# Patient Record
Sex: MALE | URBAN - METROPOLITAN AREA
[De-identification: names, ages, dates, MRNs, and addresses within clinical notes are randomized per-mention and may not be internally consistent; named-entity substitution may affect disease eponyms.]

---

## 2022-11-17 ENCOUNTER — TELEPHONE (OUTPATIENT)
Dept: FAMILY MEDICINE CLINIC | Facility: CLINIC | Age: 47
End: 2022-11-17

## 2022-11-17 NOTE — TELEPHONE ENCOUNTER
Future Appointments   Date Time Provider Antonio De Anda   11/23/2022  3:40 PM Nestor Lozano, [facility] Aisha Schwab

## 2022-11-17 NOTE — TELEPHONE ENCOUNTER
Pt called his son (Gail Zeng) is a patient of Dr. Kami Yu. Patient has [employer] and new to the area. Pt wants to know if Dr. Kami Yu would be willing to accept him as a new pt?

## 2022-11-25 ENCOUNTER — MED REC SCAN ONLY (OUTPATIENT)
Dept: FAMILY MEDICINE CLINIC | Facility: CLINIC | Age: 47
End: 2022-11-25

## 2022-12-09 ENCOUNTER — OFFICE VISIT (OUTPATIENT)
Dept: FAMILY MEDICINE CLINIC | Facility: CLINIC | Age: 47
End: 2022-12-09
Payer: OTHER GOVERNMENT

## 2022-12-09 ENCOUNTER — TELEPHONE (OUTPATIENT)
Dept: FAMILY MEDICINE CLINIC | Facility: CLINIC | Age: 47
End: 2022-12-09

## 2022-12-09 VITALS
RESPIRATION RATE: 21 BRPM | OXYGEN SATURATION: 98 % | WEIGHT: 155 LBS | TEMPERATURE: 97 F | DIASTOLIC BLOOD PRESSURE: 70 MMHG | BODY MASS INDEX: 24.91 KG/M2 | HEIGHT: 66.14 IN | SYSTOLIC BLOOD PRESSURE: 120 MMHG | HEART RATE: 83 BPM

## 2022-12-09 DIAGNOSIS — B34.9 VIRAL SYNDROME: Primary | ICD-10-CM

## 2022-12-09 DIAGNOSIS — R05.1 ACUTE COUGH: ICD-10-CM

## 2022-12-09 PROCEDURE — 3078F DIAST BP <80 MM HG: CPT | Performed by: FAMILY MEDICINE

## 2022-12-09 PROCEDURE — 3074F SYST BP LT 130 MM HG: CPT | Performed by: FAMILY MEDICINE

## 2022-12-09 PROCEDURE — 99213 OFFICE O/P EST LOW 20 MIN: CPT | Performed by: FAMILY MEDICINE

## 2022-12-09 PROCEDURE — 3008F BODY MASS INDEX DOCD: CPT | Performed by: FAMILY MEDICINE

## 2022-12-09 NOTE — TELEPHONE ENCOUNTER
Pt called he said he has been sick all week. Started on Sunday night got worse til Wednesday. Thursday started feeling a little better. He said he feels run down,has sweats,congestion, and cough. At home covid test negative. Pt wants to know if he could be seen today? He is also needing a note for work? Pt was advised  is out of office.  Can another provider fit in?

## 2022-12-09 NOTE — TELEPHONE ENCOUNTER
Patient's name and  verified     Symptoms Started  night, congestion.      Patient is experiencing no energy    Clear nasal drainage, No fever, No wheezing   Cough, not productive cough    Patient needs a note for work     Patient notified and verbalized an understanding    Future Appointments   Date Time Provider Antonio De Anda   2022  3:30 PM Valery Gonzales [facility] SOHAIL Lopez

## 2022-12-16 ENCOUNTER — TELEPHONE (OUTPATIENT)
Dept: FAMILY MEDICINE CLINIC | Facility: CLINIC | Age: 47
End: 2022-12-16

## 2022-12-16 DIAGNOSIS — R05.2 SUBACUTE COUGH: Primary | ICD-10-CM

## 2022-12-16 RX ORDER — AMOXICILLIN AND CLAVULANATE POTASSIUM 875; 125 MG/1; MG/1
1 TABLET, FILM COATED ORAL 2 TIMES DAILY
Qty: 20 TABLET | Refills: 0 | Status: SHIPPED | OUTPATIENT
Start: 2022-12-16 | End: 2022-12-26

## 2022-12-16 NOTE — TELEPHONE ENCOUNTER
Advised patient of Doctor's note below. Advised pt to call office back for condition update if no improvement after abx - Patient verbalized understanding. No further questions at this time.

## 2022-12-16 NOTE — TELEPHONE ENCOUNTER
Patient was seen 12/9 by dr Hiren Dunbar in office    Spouse states the cough never really went away    Patient feels he is now getting sicker again    No fever  No shortness of breath    Chest does hurt when coughing   Patient states he hears gurgling in his lungs when breathing    Please adv  Thank you

## 2022-12-16 NOTE — TELEPHONE ENCOUNTER
LOV 12/09/2022 - with Dr. Betzy Ling for viral illness  \"PLAN: monitor for now. if not getting better, or getting worse into next week, then I would put him on an antibiotic. \"    Please review note below    Send abx?   Please advise, thank you

## 2023-03-23 ENCOUNTER — OFFICE VISIT (OUTPATIENT)
Dept: FAMILY MEDICINE CLINIC | Facility: CLINIC | Age: 48
End: 2023-03-23
Payer: OTHER GOVERNMENT

## 2023-03-23 VITALS
HEART RATE: 75 BPM | DIASTOLIC BLOOD PRESSURE: 84 MMHG | BODY MASS INDEX: 26 KG/M2 | WEIGHT: 161.13 LBS | RESPIRATION RATE: 18 BRPM | TEMPERATURE: 99 F | OXYGEN SATURATION: 98 % | SYSTOLIC BLOOD PRESSURE: 126 MMHG

## 2023-03-23 DIAGNOSIS — B07.0 PLANTAR WART OF RIGHT FOOT: Primary | ICD-10-CM

## 2023-03-23 PROCEDURE — 3074F SYST BP LT 130 MM HG: CPT | Performed by: FAMILY MEDICINE

## 2023-03-23 PROCEDURE — 99213 OFFICE O/P EST LOW 20 MIN: CPT | Performed by: FAMILY MEDICINE

## 2023-03-23 PROCEDURE — 3079F DIAST BP 80-89 MM HG: CPT | Performed by: FAMILY MEDICINE

## 2023-10-25 ENCOUNTER — OFFICE VISIT (OUTPATIENT)
Dept: FAMILY MEDICINE CLINIC | Facility: CLINIC | Age: 48
End: 2023-10-25

## 2023-10-25 VITALS
RESPIRATION RATE: 16 BRPM | HEART RATE: 71 BPM | DIASTOLIC BLOOD PRESSURE: 62 MMHG | OXYGEN SATURATION: 99 % | BODY MASS INDEX: 25 KG/M2 | SYSTOLIC BLOOD PRESSURE: 108 MMHG | TEMPERATURE: 98 F | WEIGHT: 152.5 LBS

## 2023-10-25 DIAGNOSIS — B07.0 PLANTAR WART OF RIGHT FOOT: Primary | ICD-10-CM

## 2023-10-25 PROCEDURE — 3074F SYST BP LT 130 MM HG: CPT | Performed by: FAMILY MEDICINE

## 2023-10-25 PROCEDURE — 17110 DESTRUCTION B9 LES UP TO 14: CPT | Performed by: FAMILY MEDICINE

## 2023-10-25 PROCEDURE — 99213 OFFICE O/P EST LOW 20 MIN: CPT | Performed by: FAMILY MEDICINE

## 2023-10-25 PROCEDURE — 3078F DIAST BP <80 MM HG: CPT | Performed by: FAMILY MEDICINE

## 2025-01-06 ENCOUNTER — OFFICE VISIT (OUTPATIENT)
Dept: FAMILY MEDICINE CLINIC | Facility: CLINIC | Age: 50
End: 2025-01-06
Payer: OTHER GOVERNMENT

## 2025-01-06 VITALS
DIASTOLIC BLOOD PRESSURE: 76 MMHG | BODY MASS INDEX: 27 KG/M2 | RESPIRATION RATE: 16 BRPM | HEART RATE: 104 BPM | TEMPERATURE: 98 F | OXYGEN SATURATION: 96 % | SYSTOLIC BLOOD PRESSURE: 120 MMHG | WEIGHT: 167.38 LBS

## 2025-01-06 DIAGNOSIS — B07.0 PLANTAR WART, RIGHT FOOT: Primary | ICD-10-CM

## 2025-01-06 PROCEDURE — 99213 OFFICE O/P EST LOW 20 MIN: CPT | Performed by: FAMILY MEDICINE

## 2025-01-06 PROCEDURE — 17110 DESTRUCTION B9 LES UP TO 14: CPT | Performed by: FAMILY MEDICINE

## 2025-01-06 RX ORDER — CLOTRIMAZOLE AND BETAMETHASONE DIPROPIONATE 10; .64 MG/G; MG/G
CREAM TOPICAL
Qty: 45 G | Refills: 0 | Status: SHIPPED | OUTPATIENT
Start: 2025-01-06

## 2025-01-06 NOTE — PROGRESS NOTES
Danny Zeng is a 49 year old male.  HPI:   Danny is here for evaluation of a wart on his  right great. Was last treated WELL OVER A YEAR AGO has, never returned for treatment has been using a grater to try and remov as much callous as possible  Current Outpatient Medications   Medication Sig Dispense Refill    cholecalciferol 125 MCG (5000 UT) Oral Cap Take by mouth.      clotrimazole-betamethasone 1-0.05 % External Cream Apply to the affected area bid for 10 days 45 g 0    Meloxicam 7.5 MG Oral Tab Take 1 tablet (7.5 mg total) by mouth daily.      sertraline 50 MG Oral Tab Take 1 tablet (50 mg total) by mouth daily.        No past medical history on file.   Social History:  Social History     Socioeconomic History    Marital status:    Tobacco Use    Smoking status: Never    Smokeless tobacco: Never   Vaping Use    Vaping status: Never Used   Substance and Sexual Activity    Alcohol use: Yes     Comment: socially    Drug use: Yes     Types: Cannabis     Social Drivers of Health      Received from [facility]    Housing Stability        REVIEW OF SYSTEMS:   GENERAL HEALTH: feels well otherwise  SKIN: wart  RIGHT GREAT TOE  RESPIRATORY: denies shortness of breath with exertion  CARDIOVASCULAR: denies chest pain on exertion  GI: denies abdominal pain and denies heartburn  NEURO: denies headaches    EXAM:   /76   Pulse 104   Temp 97.9 °F (36.6 °C) (Temporal)   Resp 16   Wt 167 lb 6 oz (75.9 kg)   SpO2 96%   BMI 26.90 kg/m²   GENERAL: well developed, well nourished,in no apparent distress  SKIN: there is a small cluster of 3 small warts on the plantar surface of the right great toe  EXTREMITIES: no cyanosis, clubbing or edema    ASSESSMENT AND PLAN:     Encounter Diagnosis   Name Primary?    Plantar wart, right foot Yes   The site was debrided using a #15 blade to the core of the wart, after which liquid nitrogen was applied to the wart in freeze thaw fashion x 3, keep  clean and dry RTC in 3 weeks to recheck if still present         Meds & Refills for this Visit:  Requested Prescriptions     Signed Prescriptions Disp Refills    clotrimazole-betamethasone 1-0.05 % External Cream 45 g 0     Sig: Apply to the affected area bid for 10 days       Imaging & Consults:  None     The patient indicates understanding of these issues and agrees to the plan.  The patient is asked to return in 3 weeks for recheck and CPX.

## 2025-01-07 PROBLEM — H17.9 CORNEAL SCAR: Status: ACTIVE | Noted: 2025-01-07

## 2025-01-07 PROBLEM — H15.109 EPISCLERITIS: Status: ACTIVE | Noted: 2025-01-07

## 2025-01-07 PROBLEM — T78.1XXA ADVERSE FOOD REACTION: Status: ACTIVE | Noted: 2025-01-07

## 2025-01-07 PROBLEM — F41.9 ANXIETY DISORDER, UNSPECIFIED: Status: ACTIVE | Noted: 2025-01-07

## 2025-01-07 PROBLEM — B02.33 HERPES ZOSTER KERATOCONJUNCTIVITIS: Status: ACTIVE | Noted: 2025-01-07

## 2025-01-07 PROBLEM — M41.20 IDIOPATHIC SCOLIOSIS AND KYPHOSCOLIOSIS: Status: ACTIVE | Noted: 2025-01-07

## 2025-01-07 PROBLEM — J30.9 ALLERGIC RHINITIS: Status: ACTIVE | Noted: 2025-01-07

## 2025-01-07 PROBLEM — E55.9 VITAMIN D DEFICIENCY: Status: ACTIVE | Noted: 2025-01-07

## 2025-01-07 PROBLEM — F43.12 CHRONIC POST-TRAUMATIC STRESS DISORDER: Status: ACTIVE | Noted: 2025-01-07

## 2025-01-07 PROBLEM — F32.A DEPRESSION, UNSPECIFIED: Status: ACTIVE | Noted: 2025-01-07

## 2025-01-23 ENCOUNTER — OFFICE VISIT (OUTPATIENT)
Dept: FAMILY MEDICINE CLINIC | Facility: CLINIC | Age: 50
End: 2025-01-23
Payer: OTHER GOVERNMENT

## 2025-01-23 VITALS
DIASTOLIC BLOOD PRESSURE: 82 MMHG | SYSTOLIC BLOOD PRESSURE: 128 MMHG | RESPIRATION RATE: 16 BRPM | HEART RATE: 91 BPM | OXYGEN SATURATION: 96 % | TEMPERATURE: 97 F

## 2025-01-23 DIAGNOSIS — B07.0 PLANTAR WART, RIGHT FOOT: Primary | ICD-10-CM

## 2025-01-23 PROCEDURE — 17110 DESTRUCTION B9 LES UP TO 14: CPT | Performed by: FAMILY MEDICINE

## 2025-01-23 PROCEDURE — 99213 OFFICE O/P EST LOW 20 MIN: CPT | Performed by: FAMILY MEDICINE

## 2025-01-23 NOTE — PROGRESS NOTES
Danny Zeng is a 49 year old male.  HPI:   Danny is here for evaluation of a wart on his  right great. Was last treated WELL OVER A YEAR AGO has, never returned for treatment has been using a grater to try and remov as much callous as possible, we froze it and seemed to get a good response he has been using a grater to remove excess callous. Thinks its better. Has been using topical caustic with some response.  Current Outpatient Medications   Medication Sig Dispense Refill    cholecalciferol 125 MCG (5000 UT) Oral Cap Take by mouth.      clotrimazole-betamethasone 1-0.05 % External Cream Apply to the affected area bid for 10 days 45 g 0    Meloxicam 7.5 MG Oral Tab Take 1 tablet (7.5 mg total) by mouth daily.      sertraline 50 MG Oral Tab Take 1 tablet (50 mg total) by mouth daily.        No past medical history on file.   Social History:  Social History     Socioeconomic History    Marital status:    Tobacco Use    Smoking status: Never    Smokeless tobacco: Never   Vaping Use    Vaping status: Never Used   Substance and Sexual Activity    Alcohol use: Yes     Comment: socially    Drug use: Yes     Types: Cannabis     Social Drivers of Health      Received from [facility]    Housing Stability        REVIEW OF SYSTEMS:   GENERAL HEALTH: feels well otherwise  SKIN: wart  RIGHT GREAT TOE  RESPIRATORY: denies shortness of breath with exertion  CARDIOVASCULAR: denies chest pain on exertion  GI: denies abdominal pain and denies heartburn  NEURO: denies headaches    EXAM:   /82   Pulse 91   Temp 97.3 °F (36.3 °C) (Temporal)   Resp 16   SpO2 96%   GENERAL: well developed, well nourished,in no apparent distress  SKIN:  the previously noted cluster of 3 small warts on the plantar surface of the right great toe are pretty much gone, there appears to be a hematoma present.  EXTREMITIES: no cyanosis, clubbing or edema    ASSESSMENT AND PLAN:     Encounter Diagnosis   Name Primary?     Plantar wart, right foot Yes     The site was debrided using a #15 blade to the core of the wart, after which liquid nitrogen was applied to the wart in freeze thaw fashion x 3, keep clean and dry RTC in 3 weeks to recheck if still present         Meds & Refills for this Visit:  Requested Prescriptions      No prescriptions requested or ordered in this encounter       Imaging & Consults:  None     The patient indicates understanding of these issues and agrees to the plan.  The patient is asked to return in 3 weeks for recheck and CPX.

## 2025-02-10 ENCOUNTER — OFFICE VISIT (OUTPATIENT)
Dept: FAMILY MEDICINE CLINIC | Facility: CLINIC | Age: 50
End: 2025-02-10
Payer: OTHER GOVERNMENT

## 2025-02-10 VITALS
BODY MASS INDEX: 26.44 KG/M2 | HEIGHT: 66.5 IN | SYSTOLIC BLOOD PRESSURE: 110 MMHG | WEIGHT: 166.5 LBS | RESPIRATION RATE: 16 BRPM | HEART RATE: 91 BPM | TEMPERATURE: 97 F | OXYGEN SATURATION: 97 % | DIASTOLIC BLOOD PRESSURE: 74 MMHG

## 2025-02-10 DIAGNOSIS — Z00.00 ROUTINE HEALTH MAINTENANCE: Primary | ICD-10-CM

## 2025-02-10 PROCEDURE — 99396 PREV VISIT EST AGE 40-64: CPT | Performed by: FAMILY MEDICINE

## 2025-02-10 NOTE — PROGRESS NOTES
Danny Zeng is a 49 year old male who presents for a complete physical exam.   HPI:   Pt complains of nothing at this time. [facility] and also [facility], has a HX of fissures, was on meds,  he seems to have recovered but had a triple hemmoroidecty, he drinks enough water ,  uses a Bidet on a regularly. Had a negative colonoscopy 2022, in the .   Immunization History   Administered Date(s) Administered    Anthrax 12/14/2000, 01/11/2001, 01/24/2001, 07/03/2007, 04/05/2008, 10/10/2008, 10/14/2009, 10/25/2010, 11/08/2011, 07/02/2013, 07/30/2014, 06/17/2020    Covid-19 Vaccine Pfizer 30 mcg/0.3 ml 05/05/2021, 05/27/2021    FLU VAC QIV SPLIT 3 YRS AND OLDER (67029) 11/06/2020    FLUMIST NASAL 2 YR-49 YRS (63964) 09/16/2013, 09/08/2014    FLUZONE 6 months and older PFS 0.5 ml (47827) 10/26/2015, 10/18/2016, 10/23/2017, 10/18/2018, 10/23/2019, 12/17/2021, 10/17/2022, 01/03/2024    HEP B, Adult 04/05/2008, 06/25/2008, 12/04/2008    Hep A, Adult 04/17/1998, 10/22/1998    Influenza 10/22/1998, 11/20/1998, 10/26/1999, 12/14/2000, 11/27/2001, 11/18/2002, 12/12/2005, 11/21/2007, 10/03/2010    Influenza A, H1N1 Vaccine 12/10/2009    Intranasal (CPT=90660), Influenza Vaccine, [facility] 12/02/2004, 12/13/2006, 10/02/2008, 09/24/2009, 10/06/2011, 09/10/2012    MMR 04/17/1998, 12/27/2013    Meningococcal (Menomune) 04/10/1998    OPV 04/17/1998    Smallpox 02/03/2010    TDAP 07/29/2008, 10/02/2023    Tb Intradermal Test 04/10/1998, 05/30/2000, 07/29/2008, 10/23/2012, 10/25/2012, 01/14/2013    Td, Preserv Free 04/10/1998, 05/23/2016    Typhoid,Heat & Phenol 12/14/2000    Typhoid,VI Polysacharide IM 07/03/2007, 12/10/2009, 10/16/2012, 06/17/2020    Yellow Fever 12/14/2000     Wt Readings from Last 6 Encounters:   02/10/25 166 lb 8 oz (75.5 kg)   01/06/25 167 lb 6 oz (75.9 kg)   10/25/23 152 lb 8 oz (69.2 kg)   03/23/23 161 lb 2 oz (73.1 kg)   12/09/22 155 lb (70.3 kg)   11/23/22 155 lb (70.3 kg)      Body mass index is 26.47 kg/m².     No results found for: \"GLU\", \"GLUCOSE\"  No results found for: \"CHOLEST\"  No results found for: \"HDL\"  No results found for: \"LDL\"  No results found for: \"AST\"  No results found for: \"ALT\"  No results found for: \"PSA\"     Current Outpatient Medications   Medication Sig Dispense Refill    cholecalciferol 125 MCG (5000 UT) Oral Cap Take by mouth.      Meloxicam 7.5 MG Oral Tab Take 1 tablet (7.5 mg total) by mouth daily.      sertraline 50 MG Oral Tab Take 1 tablet (50 mg total) by mouth daily.      clotrimazole-betamethasone 1-0.05 % External Cream Apply to the affected area bid for 10 days (Patient not taking: Reported on 2/10/2025) 45 g 0      No past medical history on file.   Past Surgical History:   Procedure Laterality Date    Appendectomy      Hemorrhoidectomy      Tonsillectomy      Vasectomy        Family History   Problem Relation Age of Onset    Diabetes Father     Cancer Maternal Grandmother     Other (lung cancer) Maternal Grandmother     Cancer Paternal Grandmother     Other (lung cancer) Paternal Grandmother       Social History:  Social History     Socioeconomic History    Marital status:    Tobacco Use    Smoking status: Never    Smokeless tobacco: Never   Vaping Use    Vaping status: Never Used   Substance and Sexual Activity    Alcohol use: Yes     Comment: socially    Drug use: Yes     Types: Cannabis     Social Drivers of Health      Received from [facility]    Housing Stability      Occ: . :  2. Children:  .   Exercise: minimal.  Diet: watches calories closely     REVIEW OF SYSTEMS:   GENERAL: feels well otherwise  SKIN: denies any unusual skin lesions  EYES:denies blurred vision or double vision  HEENT: denies nasal congestion, sinus pain or ST  LUNGS: denies shortness of breath with exertion  CARDIOVASCULAR: denies chest pain on exertion  GI: denies abdominal pain,denies heartburn  : denies  nocturia or changes in stream  MUSCULOSKELETAL: denies back pain  NEURO: denies headaches  PSYCHE: denies depression or anxiety  HEMATOLOGIC: denies hx of anemia  ENDOCRINE: denies thyroid history  ALL/ASTHMA: denies hx of allergy or asthma    EXAM:   /74   Pulse 91   Temp 97.4 °F (36.3 °C) (Temporal)   Resp 16   Ht 5' 6.5\" (1.689 m)   Wt 166 lb 8 oz (75.5 kg)   SpO2 97%   BMI 26.47 kg/m²   Body mass index is 26.47 kg/m².   GENERAL: well developed, well nourished,in no apparent distress  SKIN: no rashes,no suspicious lesions  HEENT: atraumatic, normocephalic,ears and throat are clear  EYES:PERRLA, EOMI, normal optic disk,conjunctiva are clear  NECK: supple,no adenopathy,no bruits  CHEST: no chest tenderness  LUNGS: clear to auscultation  CARDIO: RRR without murmur  GI: good BS's,no masses, HSM or tenderness  : two descended testes,no masses,no hernia,no penile lesions  MUSCULOSKELETAL: back is not tender,FROM of the back  EXTREMITIES: no cyanosis, clubbing or edema  NEURO: Oriented times three,cranial nerves are intact,motor and sensory are grossly intact    ASSESSMENT AND PLAN:   Danny Zeng is a 49 year old male who presents for a complete physical exam.  Pt's weight is Body mass index is 26.47 kg/m²., recommended low fat diet and aerobic exercise 30 minutes three times weekly. Health maintenance, will check fasting Lipids, CMP, CBC and TSH. Had a screening. negative colonoscopy. Pt info handouts given for: exercise, low fat diet, testicular self exam and prostate cancer screening. The patient indicates understanding of these issues and agrees to the plan.  The patient is asked to return for CPX in 1 year.  Get copy of labs and  colonoiscopy

## (undated) NOTE — LETTER
Date: 12/9/2022    Patient Name: Pinky Zeng          To Whom it may concern: This letter has been written at the patient's request. The above patient was seen at the [facility] for treatment of a medical condition. This patient should be excused from attending work/school from 12/5/22 through 12/9/22.          Sincerely,    Karly Degroot, DO